# Patient Record
Sex: FEMALE | Race: WHITE | NOT HISPANIC OR LATINO | Employment: UNEMPLOYED | URBAN - METROPOLITAN AREA
[De-identification: names, ages, dates, MRNs, and addresses within clinical notes are randomized per-mention and may not be internally consistent; named-entity substitution may affect disease eponyms.]

---

## 2024-02-01 ENCOUNTER — OFFICE VISIT (OUTPATIENT)
Dept: URGENT CARE | Facility: CLINIC | Age: 54
End: 2024-02-01
Payer: COMMERCIAL

## 2024-02-01 VITALS
RESPIRATION RATE: 16 BRPM | HEART RATE: 91 BPM | WEIGHT: 172 LBS | OXYGEN SATURATION: 98 % | DIASTOLIC BLOOD PRESSURE: 86 MMHG | SYSTOLIC BLOOD PRESSURE: 153 MMHG | BODY MASS INDEX: 33.77 KG/M2 | HEIGHT: 60 IN | TEMPERATURE: 99 F

## 2024-02-01 DIAGNOSIS — J10.1 INFLUENZA B: Primary | ICD-10-CM

## 2024-02-01 DIAGNOSIS — H65.193 ACUTE EFFUSION OF BOTH MIDDLE EARS: ICD-10-CM

## 2024-02-01 LAB
CTP QC/QA: YES
CTP QC/QA: YES
MOLECULAR STREP A: NEGATIVE
POC MOLECULAR INFLUENZA A AGN: NEGATIVE
POC MOLECULAR INFLUENZA B AGN: POSITIVE

## 2024-02-01 PROCEDURE — 99204 OFFICE O/P NEW MOD 45 MIN: CPT | Mod: S$GLB,,, | Performed by: PHYSICIAN ASSISTANT

## 2024-02-01 PROCEDURE — 87502 INFLUENZA DNA AMP PROBE: CPT | Mod: QW,S$GLB,, | Performed by: PHYSICIAN ASSISTANT

## 2024-02-01 PROCEDURE — 87651 STREP A DNA AMP PROBE: CPT | Mod: QW,S$GLB,, | Performed by: PHYSICIAN ASSISTANT

## 2024-02-01 RX ORDER — FLUTICASONE PROPIONATE 50 MCG
1 SPRAY, SUSPENSION (ML) NASAL DAILY
Qty: 16 G | Refills: 0 | Status: SHIPPED | OUTPATIENT
Start: 2024-02-01 | End: 2024-02-08

## 2024-02-01 RX ORDER — BENZONATATE 200 MG/1
200 CAPSULE ORAL 3 TIMES DAILY PRN
Qty: 30 CAPSULE | Refills: 0 | Status: SHIPPED | OUTPATIENT
Start: 2024-02-01 | End: 2024-02-11

## 2024-02-01 NOTE — PATIENT INSTRUCTIONS
Tested positive for Influenza B today.  As we discussed you are out of the window to take flu medication.  Use Tessalon as needed for cough.  Take Tylenol/ibuprofen with food for fevers/pain. Drink plenty of fluids and get plenty of rest. Do not return to school/work until 24 hour fever free without the use of tylenol/ibuprofen. Peptobismol  as needed if you develop any diarrhea, not immodium. If your symptoms worsen please return or go to the ER for further evaluation.

## 2024-02-01 NOTE — PROGRESS NOTES
Subjective:      Patient ID: Ashley Rae is a 53 y.o. female.    Vitals:  height is 5' (1.524 m) and weight is 78 kg (172 lb). Her oral temperature is 98.9 °F (37.2 °C). Her blood pressure is 153/86 (abnormal) and her pulse is 91. Her respiration is 16 and oxygen saturation is 98%.     Chief Complaint: Sore Throat    Pt reports Sore throat, Cough and Runny nose for 3 days . Pt has been taking Xyzal . Pt was exposed to Flu and Strep    Sore Throat   This is a new problem. The current episode started in the past 7 days. The problem has been gradually worsening. There has been no fever. The pain is at a severity of 0/10. The patient is experiencing no pain. Associated symptoms include coughing. Pertinent negatives include no abdominal pain, congestion, diarrhea, drooling, ear discharge, ear pain, headaches, hoarse voice, plugged ear sensation, neck pain, shortness of breath, stridor, swollen glands, trouble swallowing or vomiting. She has had exposure to strep. She has tried nothing for the symptoms. The treatment provided no relief.       Constitution: Negative for appetite change, chills, sweating, fatigue and fever.   HENT:  Positive for postnasal drip and sore throat. Negative for ear pain, ear discharge, tinnitus, hearing loss, dental problem, drooling, mouth sores, tongue pain, tongue lesion, facial swelling, congestion, sinus pain, sinus pressure, trouble swallowing and voice change.    Neck: Negative for neck pain, neck stiffness and painful lymph nodes.   Cardiovascular:  Negative for chest pain and sob on exertion.   Eyes:  Negative for eye discharge and eye itching.   Respiratory:  Positive for cough. Negative for chest tightness, sputum production, bloody sputum, shortness of breath and stridor.    Gastrointestinal:  Negative for abdominal pain, nausea, vomiting, constipation and diarrhea.   Musculoskeletal:  Positive for muscle ache. Negative for muscle cramps.   Skin:  Negative for rash.    Neurological:  Negative for dizziness, headaches and altered mental status.   Hematologic/Lymphatic: Negative for swollen lymph nodes.   Psychiatric/Behavioral:  Negative for altered mental status.     History reviewed. No pertinent past medical history.    History reviewed. No pertinent surgical history.    History reviewed. No pertinent family history.    Social History     Socioeconomic History    Marital status:    Tobacco Use    Smoking status: Every Day     Types: Cigarettes    Smokeless tobacco: Never       No current outpatient medications on file.     No current facility-administered medications for this visit.       Review of patient's allergies indicates:  No Known Allergies    Objective:     Physical Exam   Constitutional: She is oriented to person, place, and time. She appears well-developed. She is cooperative.  Non-toxic appearance. She does not appear ill. No distress.   HENT:   Head: Normocephalic and atraumatic.   Ears:   Right Ear: Hearing, external ear and ear canal normal. impacted cerumen  Left Ear: Hearing, external ear and ear canal normal. impacted cerumen  Nose: Rhinorrhea present. No mucosal edema, nasal deformity or congestion. No epistaxis. Right sinus exhibits no maxillary sinus tenderness and no frontal sinus tenderness. Left sinus exhibits no maxillary sinus tenderness and no frontal sinus tenderness.   Mouth/Throat: Uvula is midline and mucous membranes are normal. Mucous membranes are moist. No trismus in the jaw. Normal dentition. No uvula swelling. Posterior oropharyngeal erythema present. No oropharyngeal exudate or posterior oropharyngeal edema.   Eyes: Conjunctivae and lids are normal. Right eye exhibits no discharge. Left eye exhibits no discharge. No scleral icterus.   Neck: Trachea normal and phonation normal. Neck supple. No edema present. No erythema present. No neck rigidity present.   Cardiovascular: Normal rate, regular rhythm, normal heart sounds and normal  pulses.   No murmur heard.Exam reveals no gallop and no friction rub.   Pulmonary/Chest: Effort normal and breath sounds normal. No stridor. No respiratory distress. She has no decreased breath sounds. She has no wheezes. She has no rhonchi. She has no rales.   Abdominal: Normal appearance.   Musculoskeletal:      Cervical back: She exhibits no tenderness.   Lymphadenopathy:     She has no cervical adenopathy.   Neurological: She is alert and oriented to person, place, and time. She exhibits normal muscle tone.   Skin: Skin is warm, dry, intact, not diaphoretic, not pale and no rash.   Psychiatric: Her speech is normal and behavior is normal. Mood, judgment and thought content normal.   Nursing note and vitals reviewed.    Results for orders placed or performed in visit on 02/01/24   POCT Influenza A/B MOLECULAR   Result Value Ref Range    POC Molecular Influenza A Ag Negative Negative, Not Reported    POC Molecular Influenza B Ag Positive (A) Negative, Not Reported     Acceptable Yes    POCT Strep A, Molecular   Result Value Ref Range    Molecular Strep A, POC Negative Negative     Acceptable Yes          Assessment:     1. Influenza B    2. Acute effusion of both middle ears        Plan:       Influenza B  -     POCT Influenza A/B MOLECULAR  -     POCT Strep A, Molecular  -     benzonatate (TESSALON) 200 MG capsule; Take 1 capsule (200 mg total) by mouth 3 (three) times daily as needed for Cough.  Dispense: 30 capsule; Refill: 0  -     fluticasone propionate (FLONASE) 50 mcg/actuation nasal spray; 1 spray (50 mcg total) by Each Nostril route once daily. for 7 days  Dispense: 16 g; Refill: 0    Acute effusion of both middle ears    Results reviewed  I have reviewed the patient chart and pertinent past imaging/labs.              Patient Instructions   Tested positive for Influenza B today.  As we discussed you are out of the window to take flu medication.  Use Tessalon as needed for  cough.  Take Tylenol/ibuprofen with food for fevers/pain. Drink plenty of fluids and get plenty of rest. Do not return to school/work until 24 hour fever free without the use of tylenol/ibuprofen. Peptobismol  as needed if you develop any diarrhea, not immodium. If your symptoms worsen please return or go to the ER for further evaluation.